# Patient Record
Sex: FEMALE | Race: WHITE
[De-identification: names, ages, dates, MRNs, and addresses within clinical notes are randomized per-mention and may not be internally consistent; named-entity substitution may affect disease eponyms.]

---

## 2019-10-18 ENCOUNTER — HOSPITAL (OUTPATIENT)
Dept: OTHER | Age: 74
End: 2019-10-18

## 2019-10-18 ENCOUNTER — DIAGNOSTIC TRANS (OUTPATIENT)
Dept: OTHER | Age: 74
End: 2019-10-18

## 2019-10-18 LAB
ALBUMIN SERPL-MCNC: 3.9 G/DL (ref 3.6–5.1)
ALBUMIN/GLOB SERPL: 1.3 {RATIO} (ref 1–2.4)
ALP SERPL-CCNC: 53 UNITS/L (ref 45–117)
ALT SERPL-CCNC: 23 UNITS/L
ANALYZER ANC (IANC): ABNORMAL
ANION GAP SERPL CALC-SCNC: 8 MMOL/L (ref 10–20)
APTT PPP: 25 SEC (ref 22–32)
APTT PPP: NORMAL S
APTT PPP: NORMAL S
AST SERPL-CCNC: 17 UNITS/L
BASOPHILS # BLD: 0 K/MCL (ref 0–0.3)
BASOPHILS NFR BLD: 0 %
BILIRUB SERPL-MCNC: 0.4 MG/DL (ref 0.2–1)
BUN SERPL-MCNC: 18 MG/DL (ref 6–20)
BUN/CREAT SERPL: 20 (ref 7–25)
CALCIUM SERPL-MCNC: 8.5 MG/DL (ref 8.4–10.2)
CHLORIDE SERPL-SCNC: 109 MMOL/L (ref 98–107)
CO2 SERPL-SCNC: 29 MMOL/L (ref 21–32)
CREAT SERPL-MCNC: 0.89 MG/DL (ref 0.51–0.95)
DIFFERENTIAL METHOD BLD: ABNORMAL
EOSINOPHIL # BLD: 0.1 K/MCL (ref 0.1–0.5)
EOSINOPHIL NFR BLD: 2 %
ERYTHROCYTE [DISTWIDTH] IN BLOOD: 15 % (ref 11–15)
GLOBULIN SER-MCNC: 3.1 G/DL (ref 2–4)
GLUCOSE SERPL-MCNC: 117 MG/DL (ref 65–99)
HCT VFR BLD CALC: 40.3 % (ref 36–46.5)
HGB BLD-MCNC: 12.9 G/DL (ref 12–15.5)
IMM GRANULOCYTES # BLD AUTO: 0 K/MCL (ref 0–0.2)
IMM GRANULOCYTES NFR BLD: 0 %
INR PPP: 1
INR PPP: NORMAL
LYMPHOCYTES # BLD: 1.3 K/MCL (ref 1–4)
LYMPHOCYTES NFR BLD: 21 %
MCH RBC QN AUTO: 32.2 PG (ref 26–34)
MCHC RBC AUTO-ENTMCNC: 32 G/DL (ref 32–36.5)
MCV RBC AUTO: 100.5 FL (ref 78–100)
MONOCYTES # BLD: 0.7 K/MCL (ref 0.3–0.9)
MONOCYTES NFR BLD: 11 %
NEUTROPHILS # BLD: 4.2 K/MCL (ref 1.8–7.7)
NEUTROPHILS NFR BLD: 66 %
NEUTS SEG NFR BLD: ABNORMAL %
NRBC (NRBCRE): 0 /100 WBC
PLATELET # BLD: 233 K/MCL (ref 140–450)
POTASSIUM SERPL-SCNC: 3.9 MMOL/L (ref 3.4–5.1)
PROT SERPL-MCNC: 7 G/DL (ref 6.4–8.2)
PROTHROMBIN TIME (PRT2): NORMAL
PROTHROMBIN TIME: 10.2 SEC (ref 9.7–11.8)
RBC # BLD: 4.01 MIL/MCL (ref 4–5.2)
SODIUM SERPL-SCNC: 142 MMOL/L (ref 135–145)
TROPONIN I SERPL HS-MCNC: <0.02 NG/ML
WBC # BLD: 6.3 K/MCL (ref 4.2–11)

## 2019-10-18 PROCEDURE — 99223 1ST HOSP IP/OBS HIGH 75: CPT | Performed by: INTERNAL MEDICINE

## 2019-10-18 PROCEDURE — 99223 1ST HOSP IP/OBS HIGH 75: CPT | Performed by: PSYCHIATRY & NEUROLOGY

## 2019-10-18 PROCEDURE — 99291 CRITICAL CARE FIRST HOUR: CPT | Performed by: EMERGENCY MEDICINE

## 2019-10-19 LAB
ANALYZER ANC (IANC): ABNORMAL
ANION GAP SERPL CALC-SCNC: 9 MMOL/L (ref 10–20)
BASOPHILS # BLD: 0 K/MCL (ref 0–0.3)
BASOPHILS NFR BLD: 0 %
BUN SERPL-MCNC: 15 MG/DL (ref 6–20)
BUN/CREAT SERPL: 16 (ref 7–25)
CALCIUM SERPL-MCNC: 8.2 MG/DL (ref 8.4–10.2)
CHLORIDE SERPL-SCNC: 108 MMOL/L (ref 98–107)
CHOLEST SERPL-MCNC: 204 MG/DL
CHOLEST SERPL-MCNC: ABNORMAL MG/DL
CHOLEST/HDLC SERPL: 4.3 {RATIO}
CO2 SERPL-SCNC: 29 MMOL/L (ref 21–32)
CREAT SERPL-MCNC: 0.93 MG/DL (ref 0.51–0.95)
DIFFERENTIAL METHOD BLD: ABNORMAL
EOSINOPHIL # BLD: 0.1 K/MCL (ref 0.1–0.5)
EOSINOPHIL NFR BLD: 2 %
ERYTHROCYTE [DISTWIDTH] IN BLOOD: 14.8 % (ref 11–15)
GLUCOSE SERPL-MCNC: 95 MG/DL (ref 65–99)
HBA1C MFR BLD: 10.0           24 %
HBA1C MFR BLD: 5 % (ref 4.5–5.6)
HBA1C MFR BLD: 6.0            126 %
HBA1C MFR BLD: 6.5            14 %
HBA1C MFR BLD: 7.0            154 %
HBA1C MFR BLD: 7.5            169 %
HBA1C MFR BLD: 8.0            183 %
HBA1C MFR BLD: 8.5            197 %
HBA1C MFR BLD: 9.0            212 %
HBA1C MFR BLD: 9.5            226 %
HBA1C MFR BLD: NORMAL %
HCT VFR BLD CALC: 38.9 % (ref 36–46.5)
HDLC SERPL-MCNC: 47 MG/DL
HDLC SERPL-MCNC: ABNORMAL MG/DL
HGB BLD-MCNC: 12.5 G/DL (ref 12–15.5)
IMM GRANULOCYTES # BLD AUTO: 0 K/MCL (ref 0–0.2)
IMM GRANULOCYTES NFR BLD: 0 %
LDLC SERPL CALC-MCNC: 134 MG/DL
LDLC SERPL CALC-MCNC: ABNORMAL MG/DL
LYMPHOCYTES # BLD: 2 K/MCL (ref 1–4)
LYMPHOCYTES NFR BLD: 33 %
MAGNESIUM SERPL-MCNC: 2.4 MG/DL (ref 1.7–2.4)
MCH RBC QN AUTO: 31.7 PG (ref 26–34)
MCHC RBC AUTO-ENTMCNC: 32.1 G/DL (ref 32–36.5)
MCV RBC AUTO: 98.7 FL (ref 78–100)
MONOCYTES # BLD: 0.8 K/MCL (ref 0.3–0.9)
MONOCYTES NFR BLD: 13 %
NEUTROPHILS # BLD: 3.1 K/MCL (ref 1.8–7.7)
NEUTROPHILS NFR BLD: 52 %
NEUTS SEG NFR BLD: ABNORMAL %
NONHDLC SERPL-MCNC: 157 MG/DL
NONHDLC SERPL-MCNC: ABNORMAL MG/DL
NRBC (NRBCRE): 0 /100 WBC
PHOSPHATE SERPL-MCNC: 2.8 MG/DL (ref 2.4–4.7)
PLATELET # BLD: 226 K/MCL (ref 140–450)
POTASSIUM SERPL-SCNC: 3.8 MMOL/L (ref 3.4–5.1)
RBC # BLD: 3.94 MIL/MCL (ref 4–5.2)
SODIUM SERPL-SCNC: 142 MMOL/L (ref 135–145)
TRIGL SERPL-MCNC: 114 MG/DL
TRIGL SERPL-MCNC: ABNORMAL MG/DL
WBC # BLD: 6 K/MCL (ref 4.2–11)

## 2019-10-19 PROCEDURE — 93306 TTE W/DOPPLER COMPLETE: CPT | Performed by: INTERNAL MEDICINE

## 2019-10-19 PROCEDURE — 99239 HOSP IP/OBS DSCHRG MGMT >30: CPT | Performed by: INTERNAL MEDICINE

## 2019-10-19 PROCEDURE — 99233 SBSQ HOSP IP/OBS HIGH 50: CPT | Performed by: PSYCHIATRY & NEUROLOGY

## 2019-11-14 ENCOUNTER — HOSPITAL (OUTPATIENT)
Dept: OTHER | Age: 74
End: 2019-11-14
Attending: INTERNAL MEDICINE

## 2019-12-01 ENCOUNTER — HOSPITAL (OUTPATIENT)
Dept: OTHER | Age: 74
End: 2019-12-01
Attending: INTERNAL MEDICINE

## 2020-01-01 ENCOUNTER — HOSPITAL (OUTPATIENT)
Dept: OTHER | Age: 75
End: 2020-01-01
Attending: INTERNAL MEDICINE

## 2022-11-15 ENCOUNTER — HOSPITAL ENCOUNTER (OUTPATIENT)
Dept: CT IMAGING | Facility: HOSPITAL | Age: 77
Discharge: HOME | End: 2022-11-15
Attending: INTERNAL MEDICINE
Payer: MEDICARE

## 2022-11-15 DIAGNOSIS — R14.0 BLOATING: ICD-10-CM

## 2022-11-15 DIAGNOSIS — K92.1 HEMATOCHEZIA: ICD-10-CM

## 2022-11-15 LAB
CREAT BLD-MCNC: 0.9 MG/DL
GFR SERPLBLD BASED ON 1.73 SQ M-ARVRAT: 65 ML/MIN/1.73M2 (ref 60–?)

## 2022-11-15 PROCEDURE — 74177 CT ABD & PELVIS W/CONTRAST: CPT | Performed by: INTERNAL MEDICINE

## 2022-11-15 PROCEDURE — 82565 ASSAY OF CREATININE: CPT

## 2022-11-16 ENCOUNTER — OFFICE VISIT (OUTPATIENT)
Dept: HEMATOLOGY/ONCOLOGY | Facility: HOSPITAL | Age: 77
End: 2022-11-16
Attending: OBSTETRICS & GYNECOLOGY
Payer: MEDICARE

## 2022-11-16 VITALS
OXYGEN SATURATION: 94 % | HEART RATE: 99 BPM | SYSTOLIC BLOOD PRESSURE: 160 MMHG | WEIGHT: 168.19 LBS | TEMPERATURE: 97 F | DIASTOLIC BLOOD PRESSURE: 86 MMHG | BODY MASS INDEX: 28 KG/M2 | RESPIRATION RATE: 18 BRPM

## 2022-11-16 PROBLEM — N83.8 MASS, OVARIAN: Status: ACTIVE | Noted: 2022-11-16

## 2022-11-16 PROCEDURE — 99211 OFF/OP EST MAY X REQ PHY/QHP: CPT

## 2022-11-16 NOTE — PROGRESS NOTES
Results reviewed. Released to [address]    Discussed with pt over the phone  Will arrange for urgent consultation with [facility]  Eventual colonoscopy pending Gyne workup  She will call to establish care with new PCP     Nitin Chiang MD

## 2022-11-16 NOTE — PROGRESS NOTES
Patient is here for MD consult. Patient presented with abdominal distention and firm to the touch. Hx of constipation followed by GI. Patient had a CT scan completed yesterday. Patient was found to have a large ovarian mass. Here to further discuss.        Education Record    Learner:  Patient    Disease / Diagnosis:  Consult     Barriers / Limitations:  None   Comments:    Method:  Discussion   Comments:    General Topics:  Plan of care reviewed   Comments:    Outcome:  Shows understanding   Comments:

## 2022-11-18 ENCOUNTER — OFFICE VISIT (OUTPATIENT)
Dept: INTERNAL MEDICINE CLINIC | Facility: CLINIC | Age: 77
End: 2022-11-18
Payer: MEDICARE

## 2022-11-18 VITALS
HEART RATE: 73 BPM | DIASTOLIC BLOOD PRESSURE: 70 MMHG | SYSTOLIC BLOOD PRESSURE: 122 MMHG | HEIGHT: 64.61 IN | OXYGEN SATURATION: 98 % | WEIGHT: 167.19 LBS | RESPIRATION RATE: 16 BRPM | TEMPERATURE: 98 F | BODY MASS INDEX: 28.2 KG/M2

## 2022-11-18 DIAGNOSIS — Z86.79 S/P MAZE OPERATION FOR ATRIAL FIBRILLATION: ICD-10-CM

## 2022-11-18 DIAGNOSIS — M85.89 OSTEOPENIA OF MULTIPLE SITES: ICD-10-CM

## 2022-11-18 DIAGNOSIS — I48.0 PAROXYSMAL ATRIAL FIBRILLATION (HCC): ICD-10-CM

## 2022-11-18 DIAGNOSIS — Z86.73 HISTORY OF CVA (CEREBROVASCULAR ACCIDENT): ICD-10-CM

## 2022-11-18 DIAGNOSIS — Z85.3 HISTORY OF LEFT BREAST CANCER: ICD-10-CM

## 2022-11-18 DIAGNOSIS — F41.9 ANXIETY: ICD-10-CM

## 2022-11-18 DIAGNOSIS — R19.00 PELVIC MASS: Primary | ICD-10-CM

## 2022-11-18 DIAGNOSIS — E78.00 HYPERCHOLESTEROLEMIA: ICD-10-CM

## 2022-11-18 DIAGNOSIS — Z98.890 S/P MAZE OPERATION FOR ATRIAL FIBRILLATION: ICD-10-CM

## 2022-11-18 DIAGNOSIS — Z98.890 S/P MVR (MITRAL VALVE REPAIR): ICD-10-CM

## 2022-11-18 PROCEDURE — 99205 OFFICE O/P NEW HI 60 MIN: CPT | Performed by: NURSE PRACTITIONER

## 2022-11-18 NOTE — PATIENT INSTRUCTIONS
Get the surgery as planned on Monday. Get your fasting cholesterol test done few weeks after surgery. You should be fasting for at least 10 hours. If you take a multivitamin with Biotin or any biotin product it should be held for 3 days prior to getting your labs done. Follow up in 1-2 months for your annual wellness visit or sooner as needed.

## 2022-11-23 ENCOUNTER — TELEPHONE (OUTPATIENT)
Dept: INTERNAL MEDICINE CLINIC | Facility: CLINIC | Age: 77
End: 2022-11-23

## 2022-11-23 NOTE — TELEPHONE ENCOUNTER
Medical Record Release Documents have been Signed, Faxed and Confirmation Received. Awaiting Records.

## 2022-12-21 ENCOUNTER — TELEPHONE (OUTPATIENT)
Dept: INTERNAL MEDICINE CLINIC | Facility: CLINIC | Age: 77
End: 2022-12-21

## 2022-12-21 NOTE — TELEPHONE ENCOUNTER
Incoming (mail or fax):  fax  Received from:  [facility]  Documentation given to:  Triage results bin    Lab results

## 2022-12-22 NOTE — TELEPHONE ENCOUNTER
Medical reports from [facility] coming though. Pt did the labs in 5/31/22 and  previous Dexa  Scans .  In Katelin's  bin for review

## 2023-01-03 NOTE — TELEPHONE ENCOUNTER
Noted     Future Appointments   Date Time Provider Alcon Gutierrez   1/4/2023  2:00 PM Valerie Monson, APRN [address] [facility], please call the pt also to make appt, thank you

## 2023-01-06 ENCOUNTER — MED REC SCAN ONLY (OUTPATIENT)
Dept: INTERNAL MEDICINE CLINIC | Facility: CLINIC | Age: 78
End: 2023-01-06

## 2023-03-22 ENCOUNTER — TELEPHONE (OUTPATIENT)
Dept: INTERNAL MEDICINE CLINIC | Facility: CLINIC | Age: 78
End: 2023-03-22

## 2023-03-22 NOTE — TELEPHONE ENCOUNTER
Patient saw Esther Stevens on 11/18/22 for a pelvic mass. She has ovarian cancer and sees an oncologist through [facility] (Dr Karson Guillen). She has had three chemo treatments and the last one was very difficult. She is looking for home health agencies to help with light housekeeping. She does not need medical assistance through home health. She said she has not received help from [facility] when she has asked. Does Esther Stevens have any recommendations for a  or anyone else who may be able to help? Please advise. Thank you!

## 2023-03-23 NOTE — TELEPHONE ENCOUNTER
I do not know any specific home health agencies that provides housekeeping services. She can check with her insurance first or [employer]. Please provide her information on senior service department. Some agencies I searched online that might provided non-medical home care is through [employer] or [facility]. Please give her information on those as well. Thank you.

## 2023-03-23 NOTE — TELEPHONE ENCOUNTER
Left message to call back     [facility] services [phone]  Assisting hands Alex [facility]  [phone]

## 2023-06-12 ENCOUNTER — TELEPHONE (OUTPATIENT)
Dept: INTERNAL MEDICINE CLINIC | Facility: CLINIC | Age: 78
End: 2023-06-12

## 2023-06-21 ENCOUNTER — OFFICE VISIT (OUTPATIENT)
Dept: INTERNAL MEDICINE CLINIC | Facility: CLINIC | Age: 78
End: 2023-06-21
Payer: MEDICARE

## 2023-06-21 ENCOUNTER — LAB ENCOUNTER (OUTPATIENT)
Dept: LAB | Age: 78
End: 2023-06-21
Attending: NURSE PRACTITIONER
Payer: MEDICARE

## 2023-06-21 VITALS
OXYGEN SATURATION: 98 % | SYSTOLIC BLOOD PRESSURE: 106 MMHG | HEART RATE: 73 BPM | HEIGHT: 64.61 IN | WEIGHT: 149.63 LBS | RESPIRATION RATE: 14 BRPM | TEMPERATURE: 98 F | BODY MASS INDEX: 25.23 KG/M2 | DIASTOLIC BLOOD PRESSURE: 68 MMHG

## 2023-06-21 DIAGNOSIS — E83.42 HYPOMAGNESEMIA: ICD-10-CM

## 2023-06-21 DIAGNOSIS — G45.9 TIA (TRANSIENT ISCHEMIC ATTACK): ICD-10-CM

## 2023-06-21 DIAGNOSIS — Z09 HOSPITAL DISCHARGE FOLLOW-UP: Primary | ICD-10-CM

## 2023-06-21 DIAGNOSIS — C56.1 CLEAR CELL CARCINOMA OF OVARY, RIGHT (HCC): ICD-10-CM

## 2023-06-21 DIAGNOSIS — G57.31 NEUROPATHY OF RIGHT PERONEAL NERVE: ICD-10-CM

## 2023-06-21 DIAGNOSIS — M21.371 RIGHT FOOT DROP: ICD-10-CM

## 2023-06-21 DIAGNOSIS — D64.9 ANEMIA, UNSPECIFIED TYPE: ICD-10-CM

## 2023-06-21 DIAGNOSIS — E78.00 HYPERCHOLESTEROLEMIA: ICD-10-CM

## 2023-06-21 DIAGNOSIS — C56.1 MALIGNANT NEOPLASM OF RIGHT OVARY (HCC): ICD-10-CM

## 2023-06-21 DIAGNOSIS — I10 PRIMARY HYPERTENSION: ICD-10-CM

## 2023-06-21 LAB — MAGNESIUM SERPL-MCNC: 1.6 MG/DL (ref 1.6–2.6)

## 2023-06-21 PROCEDURE — 83735 ASSAY OF MAGNESIUM: CPT | Performed by: NURSE PRACTITIONER

## 2023-06-21 PROCEDURE — 36415 COLL VENOUS BLD VENIPUNCTURE: CPT | Performed by: NURSE PRACTITIONER

## 2023-06-21 PROCEDURE — 1111F DSCHRG MED/CURRENT MED MERGE: CPT | Performed by: NURSE PRACTITIONER

## 2023-06-21 PROCEDURE — 99215 OFFICE O/P EST HI 40 MIN: CPT | Performed by: NURSE PRACTITIONER

## 2023-06-21 RX ORDER — PROCHLORPERAZINE MALEATE 10 MG
TABLET ORAL
COMMUNITY
Start: 2023-01-31 | End: 2023-06-21 | Stop reason: ALTCHOICE

## 2023-06-21 RX ORDER — CLOPIDOGREL BISULFATE 75 MG/1
75 TABLET ORAL DAILY
COMMUNITY
Start: 2023-06-10

## 2023-06-21 RX ORDER — LISINOPRIL 5 MG/1
5 TABLET ORAL DAILY
COMMUNITY
Start: 2023-06-11

## 2023-06-21 RX ORDER — ATORVASTATIN CALCIUM 40 MG/1
40 TABLET, FILM COATED ORAL NIGHTLY
COMMUNITY
Start: 2023-06-10

## 2023-06-21 RX ORDER — ONDANSETRON 4 MG/1
4 TABLET, ORALLY DISINTEGRATING ORAL EVERY 8 HOURS PRN
COMMUNITY
Start: 2023-02-17 | End: 2023-06-21 | Stop reason: ALTCHOICE

## 2023-06-26 ENCOUNTER — TELEPHONE (OUTPATIENT)
Dept: INTERNAL MEDICINE CLINIC | Facility: CLINIC | Age: 78
End: 2023-06-26

## 2023-06-26 NOTE — TELEPHONE ENCOUNTER
Pt called and stated that her neurologist prescribed her meds and she needs it sent to pill pack. Buck Sigala  advised to reach out to the neurologist office for refills

## 2023-07-11 ENCOUNTER — TELEPHONE (OUTPATIENT)
Dept: INTERNAL MEDICINE CLINIC | Facility: CLINIC | Age: 78
End: 2023-07-11

## 2023-07-11 NOTE — TELEPHONE ENCOUNTER
Talked to pt and pt said she had TIA in beginning of June. Pt worrying that her BP is elevated. Pt denies chest pain , SOB, vision changes and dizziness . Denies any acute symptoms. Pt's Blood pressure readings:   7/6 139/78  7/7 136/68  7/8 140/77  7/9 142/77  7/10 136/68    Pt said they want to keep the BP below 140/80. Pt taking lisinopril 5 MG Oral Tab daily and metoprolol succinate ER 25 MG Oral Tablet 24 Hr daily.      Please advise

## 2023-07-11 NOTE — TELEPHONE ENCOUNTER
Most of these BP readings are <140/90. If BP is persistently staying >140/90, she can increase the Lisinopril to 10 mg daily and notify us of the increase. Follow up in office if BP still staying >140/90 after increasing Lisinopril. Continue the same dose of Metoprolol succinate. Low salt diet and regular exercise as tolerated. Thanks.

## 2023-07-13 ENCOUNTER — TELEMEDICINE (OUTPATIENT)
Dept: INTERNAL MEDICINE CLINIC | Facility: CLINIC | Age: 78
End: 2023-07-13
Payer: MEDICARE

## 2023-07-13 ENCOUNTER — TELEPHONE (OUTPATIENT)
Dept: INTERNAL MEDICINE CLINIC | Facility: CLINIC | Age: 78
End: 2023-07-13

## 2023-07-13 DIAGNOSIS — R53.83 FATIGUE, UNSPECIFIED TYPE: Primary | ICD-10-CM

## 2023-07-13 DIAGNOSIS — I10 PRIMARY HYPERTENSION: ICD-10-CM

## 2023-07-13 DIAGNOSIS — E55.9 VITAMIN D DEFICIENCY: ICD-10-CM

## 2023-07-13 DIAGNOSIS — R79.89 HIGH SERUM METHYLMALONIC ACID: ICD-10-CM

## 2023-07-13 DIAGNOSIS — D64.9 ANEMIA, UNSPECIFIED TYPE: ICD-10-CM

## 2023-07-13 PROBLEM — G62.9 NEUROPATHY: Status: ACTIVE | Noted: 2023-07-13

## 2023-07-13 PROBLEM — G57.31: Status: ACTIVE | Noted: 2023-07-13

## 2023-07-13 NOTE — PROGRESS NOTES
[facility]    Virtual Video Check-In    Saima Carreno verbally consents to a [employer] visit on 07/13/23. Patient has been referred to the [facility] website at [email or web address] to review the yearly Consent to Treat document. Patient understands and accepts financial responsibility for any deductible, co-insurance and/or co-pays associated with this service. CHIEF COMPLAINT   Patient presents with:  Fatigue: Last Chemo 5/15/23, pt is still experiencing heavy fatigue daily, pt's oncologist thinks the fatigue is not related to chemo treatments - referred back to PCP      HPI:   Saima Carreno is a 76year old female with complaints of ongoing fatigue from the past couple of months. She has a history of stage IIIc ovarian clear cell ca diagnosed in 11/2022 s/p SHAW/BSO and chemo. She finished chemo mid-may. Still has ongoing fatigue since the chemo. She does have mild anemia since her surgery and chemo, Hgb in 10s with elevated MCV and MCH. She sees a neurologist who recommended she start b12 injections due to neuropathy and elevated  methylmalonic acid levels. She has tried to contact her neurologist regarding b12 injections and how to set that up but haven't heard anything back yet. She is frustrated in general because she lives alone and having to make these phone calls and arrange things is causing her to be overwhelmed. She is seeing psychiatrist and therapist.     HTN: she called with isolated elevated BP reading >140/90 few days ago. She was advised to increase the Lisinopril to 10 mg daily if BP staying >140/90. Reports it has been <140/90 and still taking Lisinopril 5 mg daily as of now. Denies any CP, SOB, palpitations, headaches, or vision changes. Current Outpatient Medications   Medication Sig Dispense Refill    clonazePAM 0.5 MG Oral Tab Take 0.5 tablets (0.25 mg total) by mouth 2 (two) times daily as needed for Anxiety.  90 tablet 3    PARoxetine 20 MG Oral Tab Take 3 tablets (60 mg total) by mouth every morning. (Patient taking differently: Take 3 tablets (60 mg total) by mouth at bedtime.) 90 tablet 3    QUEtiapine 25 MG Oral Tab Take 1 tablet (25 mg total) by mouth nightly. 90 tablet 3    atorvastatin 40 MG Oral Tab Take 1 tablet (40 mg total) by mouth nightly. TAKE AT BEDTIME      lisinopril 5 MG Oral Tab Take 1 tablet (5 mg total) by mouth daily. aspirin 81 MG Oral Chew Tab Chew 1 tablet (81 mg total) by mouth daily. metoprolol succinate ER 25 MG Oral Tablet 24 Hr Take 1 tablet (25 mg total) by mouth daily. Cyanocobalamin 1000 MCG/ML Injection Kit Take one injection daily for 1 week followed by weekly for 1 month and then monthly for 1 year. Total 21 injections. (Patient not taking: Reported on 7/13/2023)        Past Medical History:   Diagnosis Date    A-fib ([facility] [place] [address]     Acute, but ill-defined, cerebrovascular disease     Breast cancer ([facility] [place] [address]     Constipation     Feeling lonely     Hearing loss     Hemorrhoids     High cholesterol     History of depression     Irregular bowel habits     Leaking of urine     Mass, ovarian 11/16/2022    Stool incontinence     TIA (transient ischemic attack)     Wears glasses       Social History:  Social History     Socioeconomic History    Marital status:     Number of children: 2   Occupational History    Occupation: Retired RN   Tobacco Use    Smoking status: Never     Passive exposure: Never    Smokeless tobacco: Never   Vaping Use    Vaping Use: Never used   Substance and Sexual Activity    Alcohol use: Yes    Drug use: Never    Sexual activity: Not Currently   Other Topics Concern    Occupational Exposure No    Special Diet No    Exercise Yes    Seat Belt Yes    Self-Exams Yes        REVIEW OF SYSTEMS:   See HPI. EXAM:   Limited due to COVID-19  GENERAL: Appears stated age and in no apparent distress. Alert and oriented x3.    LUNGS: Patient speaks clearly in full sentences without dyspnea, no cough while on video visit.   PSYCH: Appropriate mood and affect. LABS:      Lab Results   Component Value Date    WBC 6.9 11/15/2022    RBC 4.16 11/15/2022    HGB 13.1 11/15/2022    HCT 39.6 11/15/2022    MCV 95.2 11/15/2022    MCH 31.5 11/15/2022    MCHC 33.1 11/15/2022    RDW 14.6 11/15/2022     11/15/2022      Lab Results   Component Value Date    GLU 89 11/15/2022    BUN 13 11/15/2022    BUNCREA NOT APPLICABLE 52/60/0886    CREATSERUM 0.89 11/15/2022    CA 9.0 11/15/2022    ALKPHO 55 11/15/2022    AST 15 11/15/2022    ALT 9 11/15/2022    BILT 0.8 11/15/2022    TP 7.0 11/15/2022    ALB 4.1 11/15/2022    GLOBULIN 2.9 11/15/2022    AGRATIO 1.4 11/15/2022     11/15/2022    K 3.9 11/15/2022    CL 99 11/15/2022    CO2 26 11/15/2022      No results found for: CHOLEST, TRIG, HDL, LDL, VLDL, TCHDLRATIO, NONHDLC, CHOLHDLRATIO, NONHDLC, CALCNONHDL   No results found for: T4F, TSH, TSHT4   No results found for: EAG, A1C     IMAGING:     No results found. ASSESSMENT AND PLAN:      1. Fatigue, unspecified type  -Likely multifactorial from chemo, anemia, vitamin deficiencies  -Do labs   - VITAMIN D; Future  - VITAMIN J12; Future  - FOLIC ACID SERUM(FOLATE); Future  - CBC WITH DIFFERENTIAL WITH PLATELET; Future    2. Anemia, unspecified type  -Check CBC  - CBC WITH DIFFERENTIAL WITH PLATELET; Future    3. High serum methylmalonic acid  -Advised to contact her neurologist office regarding b12 injections   - VITAMIN B12; Future    4. Primary hypertension  -BP stable now per patient. CPM  -Referred to chronic care management   - [facility] REFER TO CHRONIC CARE MGMT    5. Vitamin D deficiency  -Continue vitamin D supplements  -Check vitamin D  - VITAMIN D; Future    The patient indicates understanding of these issues and agrees to the plan. The patient is asked to return as needed or when routine care is due.     Charley Tate, APRN  7/13/2023    Patient understands phone/video evaluation is not a substitute for face-to-face examination or emergency care. Patient advised to go to the ER or call 911 for worsening symptoms or acute distress. The patient was also advised of the potential privacy & security concerns related to the telehealth platform. Lastly, the patient confirmed that they were in [place]. Please note that the following visit was completed using two-way, real-time interactive audio and video communication. This has been done in good kayla to provide continuity of care in the best interest of the provider-patient relationship, due to the ongoing public health crisis/national emergency and because of restrictions of visitation. There are limitations of this visit as no physical exam could be performed. Every conscious effort was taken to allow for sufficient and adequate time. This billing was spent on reviewing labs, medications, radiology tests and decision making. Appropriate medical decision-making and tests are ordered as detailed in the plan of care above.

## 2023-07-13 NOTE — TELEPHONE ENCOUNTER
Future Appointments   Date Time Provider Alcon Brenda   7/13/2023  3:20 PM BRENNON Gonzalez [facility] [place]   8/10/2023  1:30 PM BRENNON [facility]   10/17/2023  1:00 PM Gerard Nelson MD [facility]     Called the pt and VV made for pt to discuss this fatigue with NP today.

## 2023-07-13 NOTE — TELEPHONE ENCOUNTER
Pt called and is experiencing some fatigue. Cancer dr recommended she take to someone here as he thinks its unrelated to the pt radiation treatment. pt decline to schedule appt and would like a call back from nurse

## 2023-08-29 RX ORDER — LISINOPRIL 5 MG/1
5 TABLET ORAL DAILY
Qty: 90 TABLET | Refills: 0 | Status: SHIPPED | OUTPATIENT
Start: 2023-08-29

## 2023-08-29 NOTE — TELEPHONE ENCOUNTER
Is this medication prescribed by the [facility] Providers? No?    Did the patient contact the pharmacy directly?:  yes said it was discontinued     Is patient out of meds or supply very low?: low     Medication Requested:  lisinopril     Dose:  5 mg    Is patient requesting a 30 or 90 day supply?:  90    Pharmacy name and phone # or location:   [address] [facility] [phone], [phone]       Is the patient due for an appointment?: no (if so, please schedule appt)    Additional Notes:  pt is not sure where she got the rx but thinks it was given when she was in the hospital. Not sure if she needs additional appt for a refill on this     Please advise the patient refills take up to 72 business hours.

## 2023-08-29 NOTE — TELEPHONE ENCOUNTER
Last OV relevant to medication: 7/13/23  Last refill date: historical- started during hospitalization  When pt was asked to return for OV: 8/21/23  Upcoming appt/reason:   Future Appointments   Date Time Provider Alcon Brenda   9/11/2023  1:30 PM BRENNON Oneal [facility]   10/17/2023  1:00 PM General China MD [facility]   Was pt informed of any over due labs: message sent  Lab Results   Component Value Date    GLU 89 11/15/2022    BUN 13 11/15/2022    BUNCREA NOT APPLICABLE 70/93/1523    CREATSERUM 0.89 11/15/2022    CA 9.0 11/15/2022    ALKPHO 55 11/15/2022    AST 15 11/15/2022    ALT 9 11/15/2022    BILT 0.8 11/15/2022    TP 7.0 11/15/2022    ALB 4.1 11/15/2022    GLOBULIN 2.9 11/15/2022    AGRATIO 1.4 11/15/2022     11/15/2022    K 3.9 11/15/2022    CL 99 11/15/2022    CO2 26 11/15/2022

## 2023-09-11 ENCOUNTER — TELEPHONE (OUTPATIENT)
Dept: INTERNAL MEDICINE CLINIC | Facility: CLINIC | Age: 78
End: 2023-09-11

## 2023-09-11 NOTE — TELEPHONE ENCOUNTER
Pt called because they want to switch from an assisted living facility to a nursing home because of a recent cancer diagnosis. She says she thinks she only needs a note from Dr Flaquito Junior to get out of her lease and that's it.  I scheduled an appt with Zulema on Wednesday, unless this can be done without an appt

## 2023-09-11 NOTE — TELEPHONE ENCOUNTER
Okay to give letter without appointment. It can state her cancer diagnosis and that she needs accelerated level of care. If they require any additional information, it should ideally come from her oncologist. Thanks.

## 2023-10-17 ENCOUNTER — OFFICE VISIT (OUTPATIENT)
Dept: INTERNAL MEDICINE CLINIC | Facility: CLINIC | Age: 78
End: 2023-10-17
Payer: MEDICARE

## 2023-10-17 VITALS
HEART RATE: 72 BPM | TEMPERATURE: 98 F | RESPIRATION RATE: 16 BRPM | SYSTOLIC BLOOD PRESSURE: 130 MMHG | BODY MASS INDEX: 26.14 KG/M2 | WEIGHT: 155 LBS | DIASTOLIC BLOOD PRESSURE: 80 MMHG | HEIGHT: 64.61 IN

## 2023-10-17 DIAGNOSIS — Z86.73 HISTORY OF TRANSIENT ISCHEMIC ATTACK (TIA): ICD-10-CM

## 2023-10-17 DIAGNOSIS — Z23 NEED FOR VACCINATION: ICD-10-CM

## 2023-10-17 DIAGNOSIS — Z78.0 POSTMENOPAUSAL: ICD-10-CM

## 2023-10-17 DIAGNOSIS — E78.00 HYPERCHOLESTEROLEMIA: ICD-10-CM

## 2023-10-17 DIAGNOSIS — Z12.31 ENCOUNTER FOR SCREENING MAMMOGRAM FOR MALIGNANT NEOPLASM OF BREAST: ICD-10-CM

## 2023-10-17 DIAGNOSIS — I10 PRIMARY HYPERTENSION: ICD-10-CM

## 2023-10-17 DIAGNOSIS — C56.1 CLEAR CELL CARCINOMA OF OVARY, RIGHT (HCC): ICD-10-CM

## 2023-10-17 DIAGNOSIS — I48.0 PAROXYSMAL ATRIAL FIBRILLATION (HCC): ICD-10-CM

## 2023-10-17 DIAGNOSIS — Z00.00 ENCOUNTER FOR ANNUAL HEALTH EXAMINATION: Primary | ICD-10-CM

## 2023-10-17 DIAGNOSIS — Z12.11 SCREENING FOR COLON CANCER: ICD-10-CM

## 2023-10-17 PROCEDURE — 99204 OFFICE O/P NEW MOD 45 MIN: CPT | Performed by: INTERNAL MEDICINE

## 2023-10-17 PROCEDURE — 90677 PCV20 VACCINE IM: CPT | Performed by: INTERNAL MEDICINE

## 2023-10-17 PROCEDURE — G0439 PPPS, SUBSEQ VISIT: HCPCS | Performed by: INTERNAL MEDICINE

## 2023-10-17 PROCEDURE — G0009 ADMIN PNEUMOCOCCAL VACCINE: HCPCS | Performed by: INTERNAL MEDICINE

## 2023-10-17 RX ORDER — LISINOPRIL 10 MG/1
10 TABLET ORAL DAILY
Qty: 90 TABLET | Refills: 0 | Status: SHIPPED | OUTPATIENT
Start: 2023-10-17

## 2023-12-02 DIAGNOSIS — I10 PRIMARY HYPERTENSION: ICD-10-CM

## 2023-12-05 RX ORDER — LISINOPRIL 10 MG/1
10 TABLET ORAL DAILY
Qty: 30 TABLET | Refills: 0 | Status: SHIPPED | OUTPATIENT
Start: 2023-12-05

## 2023-12-11 LAB
AMB EXT BILIRUBIN, TOTAL: 0.4 MG/DL
AMB EXT BUN: 15 MG/DL
AMB EXT CALCIUM: 8.6
AMB EXT CARBON DIOXIDE: 31
AMB EXT CHLORIDE: 101
AMB EXT CMP ALT: 16 U/L (ref 7–52)
AMB EXT CMP AST: 19 U/L (ref 13–39)
AMB EXT CREATININE: 0.86 MG/DL
AMB EXT EGFR NON-AA: 68
AMB EXT GLUCOSE: 82 MG/DL
AMB EXT HEMATOCRIT: 39.1 (ref 34.1–44.9)
AMB EXT HEMOGLOBIN: 12 (ref 11.2–15.7)
AMB EXT MCV: 106.5 (ref 79.4–94.8)
AMB EXT PLATELETS: 183 (ref 182–369)
AMB EXT POSTASSIUM: 4.3 MMOL/L (ref 3.5–5)
AMB EXT SODIUM: 141 MMOL/L (ref 135–145)
AMB EXT TOTAL PROTEIN: 6.1 (ref 6.4–8.9)
AMB EXT TSH: 2.47 MIU/ML
AMB EXT VITAMIN D, 25-HYDROXY: 49 (ref 30–100)
AMB EXT WBC: 3.1 X10(3)UL

## 2024-01-02 ENCOUNTER — OFFICE VISIT (OUTPATIENT)
Dept: INTERNAL MEDICINE CLINIC | Facility: CLINIC | Age: 79
End: 2024-01-02
Payer: MEDICARE

## 2024-01-02 VITALS
WEIGHT: 162.81 LBS | TEMPERATURE: 98 F | DIASTOLIC BLOOD PRESSURE: 64 MMHG | HEIGHT: 64.5 IN | SYSTOLIC BLOOD PRESSURE: 106 MMHG | RESPIRATION RATE: 14 BRPM | OXYGEN SATURATION: 99 % | HEART RATE: 76 BPM | BODY MASS INDEX: 27.46 KG/M2

## 2024-01-02 DIAGNOSIS — E83.42 HYPOMAGNESEMIA: ICD-10-CM

## 2024-01-02 DIAGNOSIS — I10 PRIMARY HYPERTENSION: Primary | ICD-10-CM

## 2024-01-02 DIAGNOSIS — E83.39 HYPOPHOSPHATEMIA: ICD-10-CM

## 2024-01-02 PROCEDURE — 99214 OFFICE O/P EST MOD 30 MIN: CPT | Performed by: NURSE PRACTITIONER

## 2024-01-02 RX ORDER — AMOXICILLIN 250 MG
CAPSULE ORAL
COMMUNITY

## 2024-01-02 RX ORDER — METOPROLOL SUCCINATE 25 MG/1
25 TABLET, EXTENDED RELEASE ORAL DAILY
Qty: 90 TABLET | Refills: 1 | Status: SHIPPED | OUTPATIENT
Start: 2024-01-02

## 2024-01-02 RX ORDER — MULTIVITAMIN WITH IRON
50 TABLET ORAL DAILY
COMMUNITY

## 2024-01-02 RX ORDER — LISINOPRIL 10 MG/1
10 TABLET ORAL DAILY
Qty: 90 TABLET | Refills: 1 | Status: SHIPPED | OUTPATIENT
Start: 2024-01-02

## 2024-01-02 RX ORDER — POTASSIUM, SODIUM PHOSPHATES 280 MG-160 MG-250 MG ORAL POWDER PACKET
1 POWDER IN PACKET 2 TIMES DAILY
Qty: 6 EACH | Refills: 0 | Status: SHIPPED | OUTPATIENT
Start: 2024-01-02 | End: 2024-01-05

## 2024-01-02 RX ORDER — MULTIVIT-MIN/IRON/FOLIC ACID/K 18-600-40
CAPSULE ORAL
COMMUNITY

## 2024-01-02 RX ORDER — MULTIVIT WITH MINERALS/LUTEIN
1000 TABLET ORAL DAILY
COMMUNITY

## 2024-01-02 RX ORDER — QUETIAPINE FUMARATE 50 MG/1
50 TABLET, FILM COATED ORAL NIGHTLY
COMMUNITY
Start: 2023-11-29

## 2024-01-02 NOTE — PATIENT INSTRUCTIONS
Dairy: milk, yogurt, cheese  [place]  Beef  Poultry  Pork  Legumes  Nuts, seeds  Whole wheat breads and cereals  Some vegetables: asparagus, tomatoes, cauliflower  Processed foods (as inorganic phosphorus), especially deli meats, porter, sausage, sodas, sports drinks, and other bottled beverages    Phosphorous information above    Get labs repeated in a month    Continue your current medication    Follow up with Katelin in 6 months or sooner as needed

## 2024-01-03 NOTE — PROGRESS NOTES
Nereida Dey is a 78 year old female.  CHIEF COMPLAINT   Med check, lab review    HPI:   HTN- - Stable. No headaches or vision changes. No SE to medication. Taking medication as prescribed.     Labs- phos and mg were low.    Current Outpatient Medications   Medication Sig Dispense Refill    QUEtiapine 50 MG Oral Tab Take 1 tablet (50 mg total) by mouth nightly.      Turmeric (CURCUMIN 95 OR) Take 1,000 mg by mouth.      Cholecalciferol (VITAMIN D) 50 MCG (2000 UT) Oral Cap Take by mouth.      Magnesium Aspartate 65 MG Oral Tab Take 15.3846 tablets (1,000 mg total) by mouth.      Ascorbic Acid (VITAMIN C) 1000 MG Oral Tab Take 1 tablet (1,000 mg total) by mouth daily.      Multiple Vitamins-Minerals (BIOTIN PLUS/CALCIUM/VIT D3) Oral Tab Take by mouth.      pyridoxine 100 MG Oral Tab Take 0.5 tablets (50 mg total) by mouth daily.      Potassium & Sodium Phosphates (PHOSPHORUS SUPPLEMENT) 280-160-250 MG Oral Powd Pack Take 1 each by mouth in the morning and 1 each before bedtime. Do all this for 3 days. 6 each 0    lisinopril 10 MG Oral Tab Take 1 tablet by mouth daily. 30 tablet 0    ARIPiprazole (ABILIFY) 2 MG Oral Tab Take 1 tablet (2 mg total) by mouth nightly. 90 tablet 1    PARoxetine 20 MG Oral Tab Take 3 tablets (60 mg total) by mouth at bedtime. 90 tablet 2    atorvastatin 40 MG Oral Tab Take 1 tablet (40 mg total) by mouth nightly. TAKE AT BEDTIME      Cyanocobalamin 1000 MCG/ML Injection Kit       aspirin 81 MG Oral Chew Tab Chew 1 tablet (81 mg total) by mouth daily.      metoprolol succinate ER 25 MG Oral Tablet 24 Hr Take 1 tablet (25 mg total) by mouth daily.      clonazePAM 0.125 MG Oral Tablet Dispersible Take 1-2 tablets (0.125-0.25 mg total) by mouth every morning. (Patient not taking: Reported on 1/2/2024) 180 tablet 1    clonazePAM 0.25 MG Oral Tablet Dispersible Take 1 tablet (0.25 mg total) by mouth at bedtime. (Patient not taking: Reported on 1/2/2024) 90 tablet 1      Past Medical History:    Diagnosis Date    A-fib (HCC)     Acute, but ill-defined, cerebrovascular disease     Breast cancer (HCC)     Constipation     Feeling lonely     Hearing loss     Hemorrhoids     High cholesterol     History of depression     Irregular bowel habits     Leaking of urine     Mass, ovarian 11/16/2022    Stool incontinence     TIA (transient ischemic attack)     Wears glasses       Social History:  Social History     Socioeconomic History    Marital status:     Number of children: 2   Occupational History    Occupation: Retired RN   Tobacco Use    Smoking status: Never     Passive exposure: Never    Smokeless tobacco: Never   Vaping Use    Vaping Use: Never used   Substance and Sexual Activity    Alcohol use: Yes     Comment: approx 2 drinks per month    Drug use: Never    Sexual activity: Not Currently   Other Topics Concern    Occupational Exposure No    Special Diet No    Exercise Yes    Seat Belt Yes    Self-Exams Yes        REVIEW OF SYSTEMS:   See HPI     EXAM:     /64 (BP Location: Right arm, Patient Position: Sitting, Cuff Size: adult)   Pulse 76   Temp 97.6 °F (36.4 °C) (Temporal)   Resp 14   Ht 5' 4.5\" (1.638 m)   Wt 162 lb 12.8 oz (73.8 kg)   SpO2 99%   BMI 27.51 kg/m²   Body mass index is 27.51 kg/m².   GENERAL: well developed, well nourished,in no apparent distress  HEENT: atraumatic, normocephalic  LUNGS: clear to auscultation; no rhonchi, rales, or wheezing  CARDIO: RRR without murmur  GI: no tenderness  MUSCULOSKELETAL: No obvious joint deformity or swelling.  Normal gait.  EXTREMITIES: no edema  NEURO: Oriented times three       LABS:      Lab Results   Component Value Date    WBC 3.1 12/11/2023    RBC 4.16 11/15/2022    HGB 12.0 12/11/2023    HCT 39.1 12/11/2023    .5 (A) 12/11/2023    MCH 31.5 11/15/2022    MCHC 33.1 11/15/2022    RDW 14.6 11/15/2022     12/11/2023      Lab Results   Component Value Date    GLU 82 12/11/2023    BUN 15 12/11/2023    BUNCREA NOT  APPLICABLE 11/15/2022    CREATSERUM 0.86 12/11/2023    GFRNAA 68 12/11/2023    CA 8.6 12/11/2023    ALKPHO 55 11/15/2022    AST 19 12/11/2023    ALT 16 12/11/2023    BILT 0.4 12/11/2023    TP 6.1 (A) 12/11/2023    ALB 4.1 11/15/2022    GLOBULIN 2.9 11/15/2022    AGRATIO 1.4 11/15/2022     11/15/2022    K 4.3 12/11/2023     12/11/2023    CO2 31 12/11/2023      No results found for: \"CHOLEST\", \"TRIG\", \"HDL\", \"LDL\", \"VLDL\", \"TCHDLRATIO\", \"NONHDLC\", \"CHOLHDLRATIO\", \"CALCNONHDL\"   Lab Results   Component Value Date    TSH 2.470 12/11/2023         IMAGING:     No results found.     ASSESSMENT AND PLAN:   1. Primary hypertension  - - Stable. No headaches or vision changes. No SE to medication. Taking medication as prescribed.   - continue current medication   - lisinopril 10 MG Oral Tab; Take 1 tablet (10 mg total) by mouth daily.  Dispense: 90 tablet; Refill: 1  - metoprolol succinate ER 25 MG Oral Tablet 24 Hr; Take 1 tablet (25 mg total) by mouth daily.  Dispense: 90 tablet; Refill: 1    2. Hypomagnesemia  - continue supplement. Mg diet given  - Magnesium [E]; Future  - Basic Metabolic Panel (8) [E]; Future    3. Hypophosphatemia  - supplement ordered. Phos diet given.  - Phosphorus [E]; Future  - Potassium & Sodium Phosphates (PHOSPHORUS SUPPLEMENT) 280-160-250 MG Oral Powd Pack; Take 1 each by mouth in the morning and 1 each before bedtime. Do all this for 3 days.  Dispense: 6 each; Refill: 0      The patient indicates understanding of these issues and agrees to the plan.  The patient is asked to return in 6 months for annual visit or sooner as needed.

## 2024-01-08 RX ORDER — FLUTICASONE PROPIONATE 50 MCG
2 SPRAY, SUSPENSION (ML) NASAL DAILY
Qty: 16 G | Refills: 0 | Status: SHIPPED | OUTPATIENT
Start: 2024-01-08 | End: 2025-01-02

## 2024-01-08 NOTE — TELEPHONE ENCOUNTER
Pt was seen on 1/2/23.    She said Flonase was suppose to be sent over to:       [facility] [place] [place] - [address] [place] [address] [phone], [phone]   [address] [place] [address]   Phone: [phone] Fax: [phone]       [facility] told pt they don't have order.

## 2024-02-06 RX ORDER — FLUTICASONE PROPIONATE 50 MCG
2 SPRAY, SUSPENSION (ML) NASAL DAILY
Qty: 15.8 ML | Refills: 0 | Status: SHIPPED | OUTPATIENT
Start: 2024-02-06

## 2024-03-06 RX ORDER — FLUTICASONE PROPIONATE 50 MCG
2 SPRAY, SUSPENSION (ML) NASAL DAILY
Qty: 15.8 ML | Refills: 1 | Status: SHIPPED | OUTPATIENT
Start: 2024-03-06

## 2024-03-06 NOTE — TELEPHONE ENCOUNTER
Allergy Medication Protocol [id number]/06/2024 04:26 AM   Protocol Details In person appointment or virtual visit in the past 12 mos or appointment in next 3 mos     Future Appointments   Date Time Provider Department Center   3/26/2024  7:00 PM Zenobia Centeno APRN [facility]   7/1/2024 12:00 PM Blanca Barraza APRN [facility]

## 2024-03-11 ENCOUNTER — TELEPHONE (OUTPATIENT)
Dept: INTERNAL MEDICINE CLINIC | Facility: CLINIC | Age: 79
End: 2024-03-11

## 2024-03-11 NOTE — TELEPHONE ENCOUNTER
Pt called regarding lower Bps and orthostatic symptoms. States she feels lightheaded when first standing x 1 week but this resolves after a moment. She has noticed that her BP has consistently ran lower this last week as well, systolics have been 130s and now running in 100s. Yesterday 105/60, today 101/61. No cp, sob. Offered apt for today with Katelin but pt declined, apt scheduled for BP check tomorrow. Pt advised to hydrate well, continue to monitor BP and symptoms and if symptoms worsen in meantime to go to ER. ANAHI, thanks!

## 2024-03-12 ENCOUNTER — OFFICE VISIT (OUTPATIENT)
Dept: INTERNAL MEDICINE CLINIC | Facility: CLINIC | Age: 79
End: 2024-03-12
Payer: MEDICARE

## 2024-03-12 VITALS
RESPIRATION RATE: 14 BRPM | WEIGHT: 164.81 LBS | OXYGEN SATURATION: 97 % | TEMPERATURE: 98 F | BODY MASS INDEX: 27.8 KG/M2 | HEIGHT: 64.5 IN | HEART RATE: 86 BPM | SYSTOLIC BLOOD PRESSURE: 122 MMHG | DIASTOLIC BLOOD PRESSURE: 72 MMHG

## 2024-03-12 DIAGNOSIS — Z13.220 SCREENING FOR CHOLESTEROL LEVEL: ICD-10-CM

## 2024-03-12 DIAGNOSIS — R42 LIGHTHEADED: Primary | ICD-10-CM

## 2024-03-12 PROCEDURE — 99214 OFFICE O/P EST MOD 30 MIN: CPT | Performed by: NURSE PRACTITIONER

## 2024-03-12 RX ORDER — LISINOPRIL 5 MG/1
5 TABLET ORAL DAILY
COMMUNITY
Start: 2024-03-12

## 2024-03-12 NOTE — PROGRESS NOTES
Nereida Dey is a 78 year old female.  CHIEF COMPLAINT   Lightheadedness    HPI:   The patient has lightheadedness for the last week in the morning for less than 1 minute when first getting out of bed.  She denies any near fainting episode, balance issues, falls.  It does not happen throughout the day and it does not happen every day.  She did notice when she was dizzy her blood pressure was toward the lower end of normal around 101/61 and 105/60.  Denies headaches, trouble swallowing, trouble talking, weakness.  Is taking her     Current Outpatient Medications   Medication Sig Dispense Refill    fluticasone propionate 50 MCG/ACT Nasal Suspension Instill 2 sprays nasally daily. 15.8 mL 1    ARIPiprazole (ABILIFY) 2 MG Oral Tab Take 1 tablet (2 mg total) by mouth at bedtime. 30 tablet 1    PARoxetine 20 MG Oral Tab Take 3 tablets (60 mg total) by mouth at bedtime. 90 tablet 1    QUEtiapine 25 MG Oral Tab Take 1 tablet (25 mg total) by mouth nightly. (Patient taking differently: Take 1 tablet (25 mg total) by mouth nightly. Takes 1.5 tablets) 30 tablet 1    Cholecalciferol (VITAMIN D) 50 MCG (2000 UT) Oral Cap Take by mouth.      Magnesium Aspartate 65 MG Oral Tab Take 2 tablets (130 mg total) by mouth daily.      Ascorbic Acid (VITAMIN C) 1000 MG Oral Tab Take 1 tablet (1,000 mg total) by mouth daily.      pyridoxine 100 MG Oral Tab Take 0.5 tablets (50 mg total) by mouth daily.      lisinopril 10 MG Oral Tab Take 1 tablet (10 mg total) by mouth daily. 90 tablet 1    metoprolol succinate ER 25 MG Oral Tablet 24 Hr Take 1 tablet (25 mg total) by mouth daily. 90 tablet 1    atorvastatin 40 MG Oral Tab Take 1 tablet (40 mg total) by mouth nightly. TAKE AT BEDTIME      Cyanocobalamin 1000 MCG/ML Injection Kit       aspirin 81 MG Oral Chew Tab Chew 1 tablet (81 mg total) by mouth daily.      atomoxetine 10 MG Oral Cap Take 1 capsule (10 mg total) by mouth daily. For 7 days then take 2 caps (20mg) daily. (Patient not  taking: Reported on 3/12/2024) 30 capsule 1    atomoxetine 10 MG Oral Cap Take 10mg (1 cap) daily in AM. May increase to 20mg (2 caps) daily in AM if tolerated. (Patient not taking: Reported on 3/12/2024) 60 capsule 1    QUEtiapine 50 MG Oral Tab Take 1 tablet (50 mg total) by mouth nightly. (Patient not taking: Reported on 3/12/2024)      Turmeric (CURCUMIN 95 OR) Take 1,000 mg by mouth. (Patient not taking: Reported on 3/12/2024)      Multiple Vitamins-Minerals (BIOTIN PLUS/CALCIUM/VIT D3) Oral Tab Take by mouth. (Patient not taking: Reported on 3/12/2024)        Past Medical History:   Diagnosis Date    A-fib (HCC)     Acute, but ill-defined, cerebrovascular disease     Breast cancer (HCC)     Constipation     Feeling lonely     Hearing loss     Hemorrhoids     High cholesterol     History of depression     Irregular bowel habits     Leaking of urine     Mass, ovarian 11/16/2022    Stool incontinence     TIA (transient ischemic attack)     Wears glasses       Social History:  Social History     Socioeconomic History    Marital status:     Number of children: 2   Occupational History    Occupation: Retired RN   Tobacco Use    Smoking status: Never     Passive exposure: Never    Smokeless tobacco: Never   Vaping Use    Vaping Use: Never used   Substance and Sexual Activity    Alcohol use: Yes     Comment: approx 2 drinks per month    Drug use: Never    Sexual activity: Not Currently   Other Topics Concern    Occupational Exposure No    Special Diet No    Exercise Yes    Seat Belt Yes    Self-Exams Yes        REVIEW OF SYSTEMS:   See HPI     EXAM:     /72 (BP Location: Right arm, Patient Position: Sitting, Cuff Size: adult)   Pulse 86   Temp 97.9 °F (36.6 °C) (Temporal)   Resp 14   Ht 5' 4.5\" (1.638 m)   Wt 164 lb 12.8 oz (74.8 kg)   SpO2 97%   BMI 27.85 kg/m²   Body mass index is 27.85 kg/m².   GENERAL: well developed, well nourished,in no apparent distress  HEENT: atraumatic,  normocephalic  EYES:  conjunctiva are clear  LUNGS: clear to auscultation; no rhonchi, rales, or wheezing  CARDIO: RRR without murmur  GI: no tenderness  MUSCULOSKELETAL: No obvious joint deformity or swelling. Normal gait.  EXTREMITIES: no edema  NEURO: Oriented times three,cranial nerves are grossly intact,motor and sensory are grossly intact  Romberg is normal, no pronator drift, rapid movements are intact- tandem heel and toe walking not able to be done.    LABS:      Lab Results   Component Value Date    WBC 3.1 12/11/2023    RBC 4.16 11/15/2022    HGB 12.0 12/11/2023    HCT 39.1 12/11/2023    .5 (A) 12/11/2023    MCH 31.5 11/15/2022    MCHC 33.1 11/15/2022    RDW 14.6 11/15/2022     12/11/2023      Lab Results   Component Value Date    GLU 82 12/11/2023    BUN 15 12/11/2023    BUNCREA NOT APPLICABLE 11/15/2022    CREATSERUM 0.86 12/11/2023    GFRNAA 68 12/11/2023    CA 8.6 12/11/2023    ALKPHO 55 11/15/2022    AST 19 12/11/2023    ALT 16 12/11/2023    BILT 0.4 12/11/2023    TP 6.1 (A) 12/11/2023    ALB 4.1 11/15/2022    GLOBULIN 2.9 11/15/2022    AGRATIO 1.4 11/15/2022     11/15/2022    K 4.3 12/11/2023     12/11/2023    CO2 31 12/11/2023      No results found for: \"CHOLEST\", \"TRIG\", \"HDL\", \"LDL\", \"VLDL\", \"TCHDLRATIO\", \"NONHDLC\", \"CHOLHDLRATIO\", \"CALCNONHDL\"   Lab Results   Component Value Date    TSH 2.470 12/11/2023        ASSESSMENT AND PLAN:   1. Lightheaded  -The patient has been slightly lightheaded for 1 week for less than a minute in the morning with lower blood pressures than usual but still within normal.  No other symptoms, no neurodeficit on exam.  -Lower lisinopril to 5 mg and also place a parameter to hold if blood pressure is low  -Continue metoprolol  -Continue to monitor blood pressure at home and send readings through [employer]  -Get labs to check for anemia, electrolyte deficiency that could contribute to lightheadedness  - CBC W Differential W Platelet [E];  Future    2. Screening for cholesterol level  - Lipid Panel; Future      The patient indicates understanding of these issues and agrees to the plan.  The patient is asked to return as needed or when routine care is due.

## 2024-03-12 NOTE — PATIENT INSTRUCTIONS
Hydrate 64 ounces to 72 ounces of fluid a day.    Decrease lisinopril to 5 mg and monitor. Bring your record and BP cuff to the next apt. If the BP is less than 100/60 hold the lisinopril for that day.     Continue the metoprolol.     Debrox drops to right ear only for a week before your next apt.    Follow up in 2 weeks for a BP check or sooner as needed

## 2024-03-21 LAB
AMB EXT CHOLESTEROL, TOTAL: 135 MG/DL
AMB EXT HDL CHOLESTEROL: 48 MG/DL
AMB EXT HEMATOCRIT: 38.2
AMB EXT HEMOGLOBIN: 11.8
AMB EXT LDL CHOLESTEROL, DIRECT: 73 MG/DL
AMB EXT MCV: 106.1
AMB EXT PLATELETS: 173
AMB EXT TRIGLYCERIDES: 94 MG/DL
AMB EXT WBC: 6.1 X10(3)UL

## 2024-03-22 ENCOUNTER — TELEPHONE (OUTPATIENT)
Dept: INTERNAL MEDICINE CLINIC | Facility: CLINIC | Age: 79
End: 2024-03-22

## 2024-03-22 DIAGNOSIS — R71.8 ELEVATED MCV: Primary | ICD-10-CM

## 2024-03-22 NOTE — TELEPHONE ENCOUNTER
Incoming (mail or fax):  fax   Received from:  formerly [facility]  Documentation given to:  results bin

## 2024-03-25 NOTE — TELEPHONE ENCOUNTER
Reviewed.   No anemia.   Elevated mcv.   Can they add on b12 and folic acid levels?   Lipids okay.

## 2024-03-25 NOTE — TELEPHONE ENCOUNTER
I see. Noted. Thanks.   Yes please have her return for labs, it looks like it was done at her living facility.

## 2024-03-25 NOTE — TELEPHONE ENCOUNTER
Orders placed, pt informed and verbalized understanding. Would like them drawn at her facility, she is going to send us the fax # to send orders to via [employer].

## 2024-03-25 NOTE — TELEPHONE ENCOUNTER
May be able to add B12 but folic acid needs to be light protected so this unfortunately can't  ever be added on. Do you want pt to return for labs? Thanks!

## 2024-04-09 ENCOUNTER — TELEPHONE (OUTPATIENT)
Dept: INTERNAL MEDICINE CLINIC | Facility: CLINIC | Age: 79
End: 2024-04-09

## 2024-04-09 NOTE — TELEPHONE ENCOUNTER
She needs to go to the ER due to the weakness, dizziness and her age. She may some electrolytes off that need to be replaced and she may need IV fluids for hydration. If she does not have a ride she should call 911. Thanks.

## 2024-04-09 NOTE — TELEPHONE ENCOUNTER
Rec call from patient. Had \"stomach flu\" Sunday and Monday. Experienced vomiting and diarrhea during that time that has resolved. Now feeling weak and lightheaded. Denies extreme thirst, less frequent urination, dark-color urine, confusion. Hydrating with 7 up , cranberry/peach juice and water. Bp last night 140/80. Hadn't taken meds 2 days prior to taking it because of the vomiting. Current bp while on phone 112/76. Patient has resumed bp meds since  can tolerate food but asking what she can do regarding feeling weak/lightheaded? Advised appt may be required. Patient states it is impossible to come in as she does not have transportation.  Please advise- thanks!

## 2024-04-19 ENCOUNTER — OFFICE VISIT (OUTPATIENT)
Dept: INTERNAL MEDICINE CLINIC | Facility: CLINIC | Age: 79
End: 2024-04-19
Payer: MEDICARE

## 2024-04-19 VITALS
DIASTOLIC BLOOD PRESSURE: 82 MMHG | TEMPERATURE: 98 F | OXYGEN SATURATION: 98 % | WEIGHT: 166.81 LBS | SYSTOLIC BLOOD PRESSURE: 122 MMHG | RESPIRATION RATE: 14 BRPM | HEIGHT: 64.5 IN | BODY MASS INDEX: 28.13 KG/M2 | HEART RATE: 65 BPM

## 2024-04-19 DIAGNOSIS — R71.8 ELEVATED MCV: ICD-10-CM

## 2024-04-19 DIAGNOSIS — H61.21 IMPACTED CERUMEN OF RIGHT EAR: ICD-10-CM

## 2024-04-19 DIAGNOSIS — I10 PRIMARY HYPERTENSION: Primary | ICD-10-CM

## 2024-04-19 PROCEDURE — 99214 OFFICE O/P EST MOD 30 MIN: CPT | Performed by: NURSE PRACTITIONER

## 2024-04-19 RX ORDER — METOPROLOL SUCCINATE 25 MG/1
25 TABLET, EXTENDED RELEASE ORAL DAILY
Qty: 90 TABLET | Refills: 1 | Status: SHIPPED | OUTPATIENT
Start: 2024-04-19

## 2024-04-19 RX ORDER — LISINOPRIL 10 MG/1
TABLET ORAL
COMMUNITY
Start: 2024-03-28 | End: 2024-04-19

## 2024-04-19 RX ORDER — LISINOPRIL 10 MG/1
10 TABLET ORAL DAILY
Qty: 90 TABLET | Refills: 1 | Status: SHIPPED | OUTPATIENT
Start: 2024-04-19

## 2024-04-19 NOTE — PROGRESS NOTES
Nereida Dey is a 78 year old female.  CHIEF COMPLAINT   BP check    HPI:   HTN- - Stable. No headaches or vision changes. No SE to medication. Taking medication as prescribed.     Elevated MCV- hgb is stable. Has a 4 oz glass of wine per week.     Wax to ear- would like it irrigated.     Current Outpatient Medications   Medication Sig Dispense Refill    lisinopril 10 MG Oral Tab       atomoxetine 10 MG Oral Cap Take 2 capsules (20 mg total) by mouth every morning. 60 capsule 0    ARIPiprazole (ABILIFY) 2 MG Oral Tab Take 1 tablet (2 mg total) by mouth at bedtime. 90 tablet 1    PARoxetine 20 MG Oral Tab Take 3 tablets (60 mg total) by mouth at bedtime. 90 tablet 2    QUEtiapine 25 MG Oral Tab Take 1.5 tablets (37.5 mg total) by mouth nightly. 45 tablet 1    QUEtiapine 50 MG Oral Tab Take 1 tablet (50 mg total) by mouth nightly.      Cholecalciferol (VITAMIN D) 50 MCG (2000 UT) Oral Cap Take by mouth.      Magnesium Aspartate 65 MG Oral Tab Take 2 tablets (130 mg total) by mouth daily.      Ascorbic Acid (VITAMIN C) 1000 MG Oral Tab Take 1 tablet (1,000 mg total) by mouth daily.      metoprolol succinate ER 25 MG Oral Tablet 24 Hr Take 1 tablet (25 mg total) by mouth daily. 90 tablet 1    atorvastatin 40 MG Oral Tab Take 1 tablet (40 mg total) by mouth nightly. TAKE AT BEDTIME      Cyanocobalamin 1000 MCG/ML Injection Kit       aspirin 81 MG Oral Chew Tab Chew 1 tablet (81 mg total) by mouth daily.      fluticasone propionate 50 MCG/ACT Nasal Suspension Instill 2 sprays nasally daily. (Patient not taking: Reported on 4/19/2024) 15.8 mL 1    Turmeric (CURCUMIN 95 OR) Take 1,000 mg by mouth. (Patient not taking: Reported on 3/12/2024)      Multiple Vitamins-Minerals (BIOTIN PLUS/CALCIUM/VIT D3) Oral Tab Take by mouth. (Patient not taking: Reported on 3/12/2024)      pyridoxine 100 MG Oral Tab Take 0.5 tablets (50 mg total) by mouth daily. (Patient not taking: Reported on 4/19/2024)        Past Medical History:     A-fib (HCC)    Acute, but ill-defined, cerebrovascular disease    Breast cancer (HCC)    Constipation    Feeling lonely    Hearing loss    Hemorrhoids    High cholesterol    History of depression    Irregular bowel habits    Leaking of urine    Mass, ovarian    Stool incontinence    TIA (transient ischemic attack)    Wears glasses      Social History:  Social History     Socioeconomic History    Marital status:     Number of children: 2   Occupational History    Occupation: Retired RN   Tobacco Use    Smoking status: Never     Passive exposure: Never    Smokeless tobacco: Never   Vaping Use    Vaping status: Never Used   Substance and Sexual Activity    Alcohol use: Yes     Comment: approx 2 drinks per month    Drug use: Never    Sexual activity: Not Currently   Other Topics Concern    Occupational Exposure No    Special Diet No    Exercise Yes    Seat Belt Yes    Self-Exams Yes     Social Determinants of Health      [facility]    Housing Stability        REVIEW OF SYSTEMS:   See HPI     EXAM:     /82 (BP Location: Right arm, Patient Position: Sitting, Cuff Size: large)   Pulse 65   Temp 97.6 °F (36.4 °C) (Temporal)   Resp 14   Ht 5' 4.5\" (1.638 m)   Wt 166 lb 12.8 oz (75.7 kg)   SpO2 98%   BMI 28.19 kg/m²   Body mass index is 28.19 kg/m².   GENERAL: well developed, well nourished,in no apparent distress  HEENT: atraumatic, normocephalic  EYES:  conjunctiva are clear  LUNGS: clear to auscultation; no rhonchi, rales, or wheezing  CARDIO: RRR without murmur  GI: no tenderness  MUSCULOSKELETAL: Normal gait.  EXTREMITIES: no edema  NEURO: Oriented times three,cranial nerves are grossly intact,motor and sensory are grossly intact  Romberg is normal, no pronator drift, rapid movements are intact- tandem heel and toe walking not able to be done.    LABS:      Lab Results   Component Value Date    WBC 6.1 03/21/2024    RBC 4.16 11/15/2022     HGB 11.8 03/21/2024    HCT 38.2 03/21/2024    .1 03/21/2024    MCH 31.5 11/15/2022    MCHC 33.1 11/15/2022    RDW 14.6 11/15/2022     03/21/2024      Lab Results   Component Value Date    GLU 82 12/11/2023    BUN 15 12/11/2023    BUNCREA NOT APPLICABLE 11/15/2022    CREATSERUM 0.86 12/11/2023    GFRNAA 68 12/11/2023    CA 8.6 12/11/2023    ALKPHO 55 11/15/2022    AST 19 12/11/2023    ALT 16 12/11/2023    BILT 0.4 12/11/2023    TP 6.1 (A) 12/11/2023    ALB 4.1 11/15/2022    GLOBULIN 2.9 11/15/2022    AGRATIO 1.4 11/15/2022     11/15/2022    K 4.3 12/11/2023     12/11/2023    CO2 31 12/11/2023      Lab Results   Component Value Date    CHOLEST 135 03/21/2024    TRIG 94 03/21/2024    HDL 48 03/21/2024      Lab Results   Component Value Date    TSH 2.470 12/11/2023        ASSESSMENT AND PLAN:   1. Primary hypertension  - - Stable. No headaches or vision changes. No SE to medication. Taking medication as prescribed.   - continue current medication   - metoprolol succinate ER 25 MG Oral Tablet 24 Hr; Take 1 tablet (25 mg total) by mouth daily.  Dispense: 90 tablet; Refill: 1  - lisinopril 10 MG Oral Tab; Take 1 tablet (10 mg total) by mouth daily.  Dispense: 90 tablet; Refill: 1    2. Elevated MCV  - has minimal alcohol  - will get b12 and folate level don with next labs    3. Impacted cerumen of right ear  - irrigated, TM clear         The patient indicates understanding of these issues and agrees to the plan.  The patient is asked to return as needed or when routine care is due in October

## 2024-04-19 NOTE — PATIENT INSTRUCTIONS
Get the labs done. No need to fast.    Continue your current medication    Follow up in October for your annual visit or sooner as needed

## 2024-04-30 RX ORDER — FLUTICASONE PROPIONATE 50 MCG
2 SPRAY, SUSPENSION (ML) NASAL DAILY
Qty: 48 G | Refills: 0 | Status: SHIPPED | OUTPATIENT
Start: 2024-04-30

## 2024-04-30 NOTE — TELEPHONE ENCOUNTER
Allergy Medication Protocol [id number]/30/2024 04:32 AM   Protocol Details In person appointment or virtual visit in the past 12 mos or appointment in next 3 mos     Future Appointments   Date Time Provider Department Center   4/30/2024  7:00 PM Zenobia Centeno APRN [facility]   7/1/2024 12:00 PM Blanca Barraza APRN [facility]

## 2024-07-30 RX ORDER — FLUTICASONE PROPIONATE 50 MCG
2 SPRAY, SUSPENSION (ML) NASAL DAILY
Qty: 48 G | Refills: 0 | Status: SHIPPED | OUTPATIENT
Start: 2024-07-30

## 2024-07-30 NOTE — TELEPHONE ENCOUNTER
Allergy Medication Protocol [id number]/30/2024 04:36 AM   Protocol Details In person appointment or virtual visit in the past 12 mos or appointment in next 3 mos        Future Appointments   Date Time Provider Department Center   8/19/2024  7:00 PM Zenobia Centeno APRN [facility]

## 2024-10-28 ENCOUNTER — TELEPHONE (OUTPATIENT)
Dept: INTERNAL MEDICINE CLINIC | Facility: CLINIC | Age: 79
End: 2024-10-28

## 2024-10-28 RX ORDER — FLUTICASONE PROPIONATE 50 MCG
2 SPRAY, SUSPENSION (ML) NASAL DAILY
Qty: 48 G | Refills: 0 | Status: SHIPPED | OUTPATIENT
Start: 2024-10-28

## 2024-10-28 NOTE — TELEPHONE ENCOUNTER
Allergy Medication Protocol Uhbvlr22/27/2024 04:26 AM   Protocol Details In person appointment or virtual visit in the past 12 mos or appointment in next 3 mos        Future Appointments   Date Time Provider Department Center   10/28/2024  6:30 PM Zenobia Centeno APRN [facility]         Refill sent, pt is due for annual physical exam. Please call to schedule thank you.

## 2024-10-28 NOTE — TELEPHONE ENCOUNTER
Incoming (mail or fax):  Fax  Received from:  [facility]  Documentation given to:  Triage in    [employer] Form(s)

## 2024-11-08 NOTE — TELEPHONE ENCOUNTER
Unable to leave message to schedule annual wellness visit - mailbox full. Attempted to contact patient multiple times - letter mailed and sent through [employer]

## 2024-12-04 ENCOUNTER — TELEPHONE (OUTPATIENT)
Dept: INTERNAL MEDICINE CLINIC | Facility: CLINIC | Age: 79
End: 2024-12-04

## 2024-12-04 NOTE — TELEPHONE ENCOUNTER
Does not look that we prescribed it. It was external med in the medication. Maybe cards?    Please clarify that is what you see as well?

## 2024-12-04 NOTE — TELEPHONE ENCOUNTER
Patient said Louis keeps sending Lipitor refills to her.  She thought [facility] had stopped prescribing this medication.  [facility] needs notification of this change from [facility].  Please advise. Thank you!

## 2024-12-26 ENCOUNTER — TELEPHONE (OUTPATIENT)
Dept: INTERNAL MEDICINE CLINIC | Facility: CLINIC | Age: 79
End: 2024-12-26

## 2024-12-26 DIAGNOSIS — I10 PRIMARY HYPERTENSION: ICD-10-CM

## 2024-12-26 NOTE — TELEPHONE ENCOUNTER
PSR called patient to schedule an appointment after receiving a medication request.  Patient said she has moved and has a new PCP.  Please start PCP removal process.

## 2024-12-27 ENCOUNTER — PATIENT OUTREACH (OUTPATIENT)
Dept: CASE MANAGEMENT | Age: 79
End: 2024-12-27

## 2024-12-27 RX ORDER — LISINOPRIL 10 MG/1
10 TABLET ORAL DAILY
Qty: 90 TABLET | Refills: 0 | Status: SHIPPED | OUTPATIENT
Start: 2024-12-27

## 2024-12-27 NOTE — PROCEDURES
The office order for PCP removal request is Approved and finalized on December 27, 2024.    Removed Janki Dowell MD as the patient's Primary Care Physician

## 2024-12-27 NOTE — TELEPHONE ENCOUNTER
Last OV relevant to medication: 4/19/24  Last refill date: 4/19/24 #90/refills: 1  When pt was asked to return for OV: October for annua  Upcoming appt/reason:   Future Appointments   Date Time Provider Department Center   4/16/2025  6:00 PM Zenobia Centeno APRN [facility]   Was pt informed of any over due labs: overdue  Lab Results   Component Value Date    GLU 82 12/11/2023    BUN 15 12/11/2023    BUNCREA NOT APPLICABLE 11/15/2022    CREATSERUM 0.86 12/11/2023    GFRNAA 68 12/11/2023    CA 8.6 12/11/2023    ALKPHO 55 11/15/2022    AST 19 12/11/2023    ALT 16 12/11/2023    BILT 0.4 12/11/2023    TP 6.1 (A) 12/11/2023    ALB 4.1 11/15/2022    GLOBULIN 2.9 11/15/2022    AGRATIO 1.4 11/15/2022     11/15/2022    K 4.3 12/11/2023     12/11/2023    CO2 31 12/11/2023     Pt overdue for annual, please call to schedule thanks!!

## 2025-01-27 RX ORDER — FLUTICASONE PROPIONATE 50 MCG
2 SPRAY, SUSPENSION (ML) NASAL DAILY
Qty: 16 G | Refills: 0 | Status: SHIPPED | OUTPATIENT
Start: 2025-01-27 | End: 2025-03-03

## 2025-01-27 NOTE — TELEPHONE ENCOUNTER
Allergy Medication Protocol [id number]/25/2025 04:39 AM   Protocol Details In person appointment or virtual visit in the past 12 mos or appointment in next 3 mos    Medication is active on med list        Future Appointments   Date Time Provider Department Center   3/3/2025  3:20 PM Justo James PA [facility]   4/16/2025  8:00 PM Zenobia Centeno APRN [facility]

## 2025-03-03 RX ORDER — FLUTICASONE PROPIONATE 50 MCG
2 SPRAY, SUSPENSION (ML) NASAL DAILY
Qty: 16 G | Refills: 0 | Status: SHIPPED | OUTPATIENT
Start: 2025-03-03

## 2025-03-03 NOTE — TELEPHONE ENCOUNTER
Allergy Medication Protocol Passed02/28/2025 04:33 AM   Protocol Details In person appointment or virtual visit in the past 12 mos or appointment in next 3 mos    Medication is active on med list

## 2025-03-28 RX ORDER — FLUTICASONE PROPIONATE 50 MCG
2 SPRAY, SUSPENSION (ML) NASAL DAILY
Refills: 0 | OUTPATIENT
Start: 2025-03-28

## 2025-07-08 ENCOUNTER — APPOINTMENT (OUTPATIENT)
Dept: CT IMAGING | Facility: HOSPITAL | Age: 80
End: 2025-07-08
Attending: EMERGENCY MEDICINE
Payer: MEDICARE

## 2025-07-08 ENCOUNTER — HOSPITAL ENCOUNTER (EMERGENCY)
Facility: HOSPITAL | Age: 80
Discharge: NURSING, REHAB OR LONG-TERM CARE | End: 2025-07-08
Attending: EMERGENCY MEDICINE
Payer: MEDICARE

## 2025-07-08 VITALS
TEMPERATURE: 98 F | SYSTOLIC BLOOD PRESSURE: 113 MMHG | HEART RATE: 79 BPM | RESPIRATION RATE: 17 BRPM | OXYGEN SATURATION: 98 % | DIASTOLIC BLOOD PRESSURE: 60 MMHG

## 2025-07-08 DIAGNOSIS — S20.229A CONTUSION OF BACK, UNSPECIFIED LATERALITY, INITIAL ENCOUNTER: ICD-10-CM

## 2025-07-08 DIAGNOSIS — M54.2 NECK PAIN: ICD-10-CM

## 2025-07-08 DIAGNOSIS — N13.30 HYDRONEPHROSIS, UNSPECIFIED HYDRONEPHROSIS TYPE: ICD-10-CM

## 2025-07-08 DIAGNOSIS — S09.90XA INJURY OF HEAD, INITIAL ENCOUNTER: Primary | ICD-10-CM

## 2025-07-08 PROCEDURE — 99285 EMERGENCY DEPT VISIT HI MDM: CPT

## 2025-07-08 PROCEDURE — 72131 CT LUMBAR SPINE W/O DYE: CPT | Performed by: RADIOLOGY

## 2025-07-08 PROCEDURE — S0119 ONDANSETRON 4 MG: HCPCS | Performed by: EMERGENCY MEDICINE

## 2025-07-08 PROCEDURE — 72128 CT CHEST SPINE W/O DYE: CPT | Performed by: RADIOLOGY

## 2025-07-08 PROCEDURE — 70450 CT HEAD/BRAIN W/O DYE: CPT | Performed by: RADIOLOGY

## 2025-07-08 PROCEDURE — 72125 CT NECK SPINE W/O DYE: CPT | Performed by: RADIOLOGY

## 2025-07-08 RX ORDER — HYDROCODONE BITARTRATE AND ACETAMINOPHEN 5; 325 MG/1; MG/1
1 TABLET ORAL ONCE
Refills: 0 | Status: COMPLETED | OUTPATIENT
Start: 2025-07-08 | End: 2025-07-08

## 2025-07-08 RX ORDER — ONDANSETRON 4 MG/1
4 TABLET, ORALLY DISINTEGRATING ORAL ONCE
Status: COMPLETED | OUTPATIENT
Start: 2025-07-08 | End: 2025-07-08

## 2025-07-08 NOTE — DISCHARGE INSTRUCTIONS
Continue medications.  Follow-up with doctors.  Continue pain meds or Tylenol.  Return to the ER for changes or worsening.  Read all instructions.    There was no acute trauma seen on the CAT scans but patient does have a right hydronephrosis that was also seen on May 1 at [place]'s CAT scan

## 2025-07-08 NOTE — ED INITIAL ASSESSMENT (HPI)
Pt here via EMS for a fall backward out of the wheelchair in the medicar. Pt c/o back pain and posterior head pain. Pt A&Ox4. Pt on Eliquis. Took dose this morning.

## 2025-07-08 NOTE — ED PROVIDER NOTES
Patient Seen in: [facility] Emergency Department        History  Chief Complaint   Patient presents with    Fall    Back Pain     Stated Complaint:     Subjective:   79-year-old female with history of A-fib on Eliquis, breast cancer, depression, TIA here status post fall.  She was being taken to her rehab facility from [place] and she said they did not secure her chair in the transport van and it went back words and she hit her head.  She did not lose consciousness.  She does have slight headache and has chronic posterior neck pain.  She is also having new back pain but also has some chronic back pain.  She said it is both upper and lower back pain.  Denies LOC nausea vomiting.  No vision change.  No weakness or numbness.  No pain in her hips or chest or abdomen.  I did speak to EMS directly who told me she fell forward but she told both the nurse and I that she fell backward.  Medications from her prior hospital were reviewed with the paperwork that was sent with her.                      Objective:     Past Medical History:    A-fib (HCC)    Acute, but ill-defined, cerebrovascular disease    Breast cancer (HCC)    Constipation    Feeling lonely    Hearing loss    Hemorrhoids    High cholesterol    History of depression    Irregular bowel habits    Leaking of urine    Mass, ovarian    Stool incontinence    TIA (transient ischemic attack)    Wears glasses              Past Surgical History:   Procedure Laterality Date    Cataract Bilateral 2021    Mastectomy left Left 1983    Oophorectomy Left 1965    d/t large ovarian cyst    Tcat mitral valve repair initial prosthesis  2014    Total abdominal hysterectomy  11/21/2022    [facility], Right salpingo-oophorectomy, L salpingectomy due to large pelvic/ovarian cancer by Dr. White                Social History     Socioeconomic History    Marital status:     Number of children: 2   Occupational History    Occupation: Retired RN   Tobacco Use    Smoking status:  Never     Passive exposure: Never    Smokeless tobacco: Never   Vaping Use    Vaping status: Never Used   Substance and Sexual Activity    Alcohol use: Yes     Comment: approx 2 drinks per month    Drug use: Never    Sexual activity: Not Currently   Other Topics Concern    Occupational Exposure No    Special Diet No    Exercise Yes    Seat Belt Yes    Self-Exams Yes     Social Drivers of Health     Food Insecurity: No Food Insecurity (7/3/2025)    Received from [facility]    Hunger Vital Sign     Worried About Running Out of Food in the Last Year: Never true     Ran Out of Food in the Last Year: Never true   Transportation Needs: No Transportation Needs (7/3/2025)    Received from [facility]    PRAPARE - Transportation     Lack of Transportation (Medical): No     Lack of Transportation (Non-Medical): No   Housing Stability: Low Risk  (7/3/2025)    Received from [facility]    Housing Stability Vital Sign     Unable to Pay for Housing in the Last Year: No     Number of Times Moved in the Last Year: 0     Homeless in the Last Year: No                                Physical Exam    ED Triage Vitals [07/08/25 1545]   /72   Pulse 75   Resp 17   Temp 97.7 °F (36.5 °C)   Temp src Temporal   SpO2 99 %   O2 Device None (Room air)       Current Vitals:   Vital Signs  BP: 127/68  Pulse: 79  Resp: 17  Temp: 97.7 °F (36.5 °C)  Temp src: Temporal  MAP (mmHg): 85    Oxygen Therapy  SpO2: 98 %  O2 Device: None (Room air)            Physical Exam  HENT:      Head: Normocephalic and atraumatic.      Right Ear: External ear normal.      Left Ear: External ear normal.      Mouth/Throat:      Mouth: Mucous membranes are dry.   Eyes:      Extraocular Movements: Extraocular movements intact.      Pupils: Pupils are equal, round, and reactive to light.   Neck:      Comments: Mild low C-spine tenderness and tenderness throughout the entire spine but no  step-offs  Cardiovascular:      Rate and Rhythm: Normal rate and regular rhythm.      Pulses: Normal pulses.      Heart sounds: Normal heart sounds.   Pulmonary:      Effort: Pulmonary effort is normal.      Breath sounds: Normal breath sounds.   Abdominal:      Palpations: Abdomen is soft.      Tenderness: There is no abdominal tenderness.   Musculoskeletal:         General: No swelling or tenderness.      Comments: Good range of motion of all joints and extremities without any discomfort.  Distal pulses intact.  No deformity.   Skin:     General: Skin is warm.      Capillary Refill: Capillary refill takes less than 2 seconds.   Neurological:      Mental Status: She is alert.      Sensory: No sensory deficit.      Motor: No weakness.                 ED Course  Labs Reviewed - No data to display    ED Course as of 07/08/25 1901  ------------------------------------------------------------  Time: 07/08 1850  Comment: All CTs independently turbid by me.  No acute intracranial hemorrhage or spinal fracture.  Patient does have right hydronephrosis.  The contrast seen was not from our institution.  I did go back into [facility] radiology reports and she has been having significant right hydronephrosis since May 1.  I discussed this with both her son and daughter on the phone individually at great length.  They were upset because the ambulance company let patient                       MDM     CT SPINE THORACIC (CPT=72128)  Result Date: 7/8/2025  1.  CONCLUSION: 2.  No acute fracture or subluxation. 3.  Moderate amount of ascites related to the known ovarian cancer. 4.  Bibasilar pleural effusions with compressive atelectasis. 5.  Large hiatal hernia. 6.  Severe right hydronephrosis with retained contrast in the right renal collecting system.  Electronically Verified and Signed by Attending Radiologist: Octavio Degroot MD 7/8/2025 6:13 PM Workstation: [id number]    CT SPINE CERVICAL (CPT=72125)  Result Date:  7/8/2025  CONCLUSION: No acute fracture or subluxation.  Electronically Verified and Signed by Attending Radiologist: Octavio Degroot MD 7/8/2025 5:59 PM Workstation: [id number]    CT BRAIN OR HEAD (CPT=70450)  Result Date: 7/8/2025  CONCLUSION:  1.  No acute intracranial finding. 2.  Moderate changes of chronic small vessel disease in both cerebral hemispheres. 3.  Large vessel intracranial atherosclerosis. Electronically Verified and Signed by Attending Radiologist: Octavio Degroot MD 7/8/2025 5:54 PM Workstation: [id number]    CT SPINE LUMBAR (CPT=72131)  Result Date: 7/8/2025  CONCLUSION: No acute fracture or subluxation. Chronic impaction deformity S3 segment of sacrum. Partially visualized ascites. Severe right hydroureteronephrosis to the level of distal pelvic ureter with retained contrast in the pelvic calyceal system and ureter. Postsurgical changes which are partially visualized in the pelvis. No contrast excretion into the left kidney. Bilateral pleural effusions. Large hiatal hernia. Electronically Verified and Signed by Attending Radiologist: Octavio Degroot MD 7/8/2025 5:52 PM Workstation: [id number]            Medical Decision Making  Patient on Eliquis here with mechanical fall backwards in the wheelchair.  Could have contusion, concussion, intracranial hemorrhage, fractures, sprains, contusions multiple other problems.  I reviewed all her medications in the packet that was sent with her.    Amount and/or Complexity of Data Reviewed  External Data Reviewed: notes.     Details: [facility] notes explained the patient has malignant ascites and hydronephrosis.  Labs: ordered. Decision-making details documented in ED Course.  Radiology: ordered and independent interpretation performed. Decision-making details documented in ED Course.  Discussion of management or test interpretation with external provider(s): See ED course for lengthy discussion.  Discussed with both children.  They were quite upset  that the transport company allowed her to fall    Risk  Risk Details: Malignant ovarian cancer        Disposition and Plan     Clinical Impression:  1. Injury of head, initial encounter    2. Neck pain    3. Contusion of back, unspecified laterality, initial encounter    4. Hydronephrosis, unspecified hydronephrosis type         Disposition:  Discharge  7/8/2025  6:50 pm    Follow-up:  Gale Barajas MD  [address] [facility] [address]  [phone]    Follow up      We recommend that you schedule follow up care with a primary care provider within the next three months to obtain basic health screening including reassessment of your blood pressure.      Medications Prescribed:  Current Discharge Medication List                Supplementary Documentation:

## 2025-07-09 NOTE — ED QUICK NOTES
This RN called and gave report to MONA Romo at [facility] - pts receiving RN.

## 2025-07-09 NOTE — ED QUICK NOTES
Rounding Completed    Plan of Care reviewed. Waiting for [facility].  Elimination needs assessed.  Provided bedpan.    Bed is locked and in lowest position. Call light within reach.

## 2025-08-23 ENCOUNTER — APPOINTMENT (OUTPATIENT)
Dept: CT IMAGING | Facility: HOSPITAL | Age: 80
End: 2025-08-23
Attending: EMERGENCY MEDICINE

## 2025-08-23 ENCOUNTER — APPOINTMENT (OUTPATIENT)
Dept: GENERAL RADIOLOGY | Facility: HOSPITAL | Age: 80
End: 2025-08-23
Attending: EMERGENCY MEDICINE

## 2025-08-23 ENCOUNTER — HOSPITAL ENCOUNTER (EMERGENCY)
Facility: HOSPITAL | Age: 80
Discharge: HOME | End: 2025-08-23
Attending: EMERGENCY MEDICINE

## 2025-08-23 ENCOUNTER — APPOINTMENT (OUTPATIENT)
Dept: MRI IMAGING | Facility: HOSPITAL | Age: 80
End: 2025-08-23
Attending: EMERGENCY MEDICINE

## 2025-08-23 VITALS
WEIGHT: 134 LBS | OXYGEN SATURATION: 97 % | SYSTOLIC BLOOD PRESSURE: 130 MMHG | BODY MASS INDEX: 23 KG/M2 | RESPIRATION RATE: 27 BRPM | HEART RATE: 80 BPM | DIASTOLIC BLOOD PRESSURE: 58 MMHG | TEMPERATURE: 98 F

## 2025-08-23 DIAGNOSIS — N17.9 ACUTE RENAL FAILURE, UNSPECIFIED ACUTE RENAL FAILURE TYPE: ICD-10-CM

## 2025-08-23 DIAGNOSIS — R18.0 MALIGNANT ASCITES (HCC): Primary | ICD-10-CM

## 2025-08-23 DIAGNOSIS — R53.1 WEAKNESS GENERALIZED: ICD-10-CM

## 2025-08-23 LAB
ALBUMIN SERPL-MCNC: 2.6 G/DL (ref 3.2–4.8)
ALBUMIN/GLOB SERPL: 1.3 (ref 1–2)
ALP LIVER SERPL-CCNC: 98 U/L (ref 55–142)
ALT SERPL-CCNC: 23 U/L (ref 10–49)
ANION GAP SERPL CALC-SCNC: 13 MMOL/L (ref 0–18)
APTT PPP: 42.4 SECONDS (ref 23–36)
AST SERPL-CCNC: 28 U/L (ref ?–34)
ATRIAL RATE: 101 BPM
BASOPHILS # BLD AUTO: 0.01 X10(3) UL (ref 0–0.2)
BASOPHILS NFR BLD AUTO: 0.1 %
BILIRUB SERPL-MCNC: 0.5 MG/DL (ref 0.2–1.1)
BUN BLD-MCNC: 97 MG/DL (ref 9–23)
BUN/CREAT SERPL: 36.2 (ref 10–20)
CALCIUM BLD-MCNC: 6.3 MG/DL (ref 8.7–10.4)
CHLORIDE SERPL-SCNC: 98 MMOL/L (ref 98–112)
CO2 SERPL-SCNC: 18 MMOL/L (ref 21–32)
CREAT BLD-MCNC: 2.68 MG/DL (ref 0.55–1.02)
D DIMER PPP FEU-MCNC: 3.98 UG/ML FEU (ref ?–0.81)
DEPRECATED RDW RBC AUTO: 68.1 FL (ref 35.1–46.3)
EGFRCR SERPLBLD CKD-EPI 2021: 17 ML/MIN/1.73M2 (ref 60–?)
EOSINOPHIL # BLD AUTO: 0 X10(3) UL (ref 0–0.7)
EOSINOPHIL NFR BLD AUTO: 0 %
ERYTHROCYTE [DISTWIDTH] IN BLOOD BY AUTOMATED COUNT: 20.9 % (ref 11–15)
GLOBULIN PLAS-MCNC: 2 G/DL (ref 2–3.5)
GLUCOSE BLD-MCNC: 133 MG/DL (ref 70–99)
GLUCOSE BLDC GLUCOMTR-MCNC: 187 MG/DL (ref 70–99)
HCT VFR BLD AUTO: 27.3 % (ref 35–48)
HGB BLD-MCNC: 8.4 G/DL (ref 12–16)
IMM GRANULOCYTES # BLD AUTO: 0.21 X10(3) UL (ref 0–1)
IMM GRANULOCYTES NFR BLD: 2.2 %
INR BLD: 2.4 (ref 0.8–1.2)
LYMPHOCYTES # BLD AUTO: 0.37 X10(3) UL (ref 1–4)
LYMPHOCYTES NFR BLD AUTO: 3.8 %
MCH RBC QN AUTO: 28.3 PG (ref 26–34)
MCHC RBC AUTO-ENTMCNC: 30.8 G/DL (ref 31–37)
MCV RBC AUTO: 91.9 FL (ref 80–100)
MONOCYTES # BLD AUTO: 0.45 X10(3) UL (ref 0.1–1)
MONOCYTES NFR BLD AUTO: 4.7 %
NEUTROPHILS # BLD AUTO: 8.62 X10 (3) UL (ref 1.5–7.7)
NEUTROPHILS # BLD AUTO: 8.62 X10(3) UL (ref 1.5–7.7)
NEUTROPHILS NFR BLD AUTO: 89.2 %
OSMOLALITY SERPL CALC.SUM OF ELEC: 300 MOSM/KG (ref 275–295)
P AXIS: 82 DEGREES
P-R INTERVAL: 174 MS
PLATELET # BLD AUTO: 440 10(3)UL (ref 150–450)
PLATELET MORPHOLOGY: NORMAL
POTASSIUM SERPL-SCNC: 4.3 MMOL/L (ref 3.5–5.1)
PROT SERPL-MCNC: 4.6 G/DL (ref 5.7–8.2)
PROTHROMBIN TIME: 27.2 SECONDS (ref 11.6–14.8)
Q-T INTERVAL: 348 MS
QRS DURATION: 82 MS
QTC CALCULATION (BEZET): 451 MS
R AXIS: -21 DEGREES
RBC # BLD AUTO: 2.97 X10(6)UL (ref 3.8–5.3)
SODIUM SERPL-SCNC: 129 MMOL/L (ref 136–145)
T AXIS: 111 DEGREES
VENTRICULAR RATE: 101 BPM
WBC # BLD AUTO: 9.7 X10(3) UL (ref 4–11)

## 2025-08-23 PROCEDURE — 99285 EMERGENCY DEPT VISIT HI MDM: CPT

## 2025-08-23 PROCEDURE — 70551 MRI BRAIN STEM W/O DYE: CPT | Performed by: EMERGENCY MEDICINE

## 2025-08-23 PROCEDURE — 70496 CT ANGIOGRAPHY HEAD: CPT | Performed by: EMERGENCY MEDICINE

## 2025-08-23 PROCEDURE — 82962 GLUCOSE BLOOD TEST: CPT

## 2025-08-23 PROCEDURE — 70498 CT ANGIOGRAPHY NECK: CPT | Performed by: EMERGENCY MEDICINE

## 2025-08-23 PROCEDURE — 85610 PROTHROMBIN TIME: CPT | Performed by: EMERGENCY MEDICINE

## 2025-08-23 PROCEDURE — 85025 COMPLETE CBC W/AUTO DIFF WBC: CPT | Performed by: EMERGENCY MEDICINE

## 2025-08-23 PROCEDURE — 93010 ELECTROCARDIOGRAM REPORT: CPT

## 2025-08-23 PROCEDURE — 85730 THROMBOPLASTIN TIME PARTIAL: CPT | Performed by: EMERGENCY MEDICINE

## 2025-08-23 PROCEDURE — 71045 X-RAY EXAM CHEST 1 VIEW: CPT | Performed by: EMERGENCY MEDICINE

## 2025-08-23 PROCEDURE — 96374 THER/PROPH/DIAG INJ IV PUSH: CPT

## 2025-08-23 PROCEDURE — 85379 FIBRIN DEGRADATION QUANT: CPT | Performed by: EMERGENCY MEDICINE

## 2025-08-23 PROCEDURE — 93005 ELECTROCARDIOGRAM TRACING: CPT

## 2025-08-23 PROCEDURE — 96376 TX/PRO/DX INJ SAME DRUG ADON: CPT

## 2025-08-23 PROCEDURE — 74176 CT ABD & PELVIS W/O CONTRAST: CPT | Performed by: EMERGENCY MEDICINE

## 2025-08-23 PROCEDURE — 80053 COMPREHEN METABOLIC PANEL: CPT | Performed by: EMERGENCY MEDICINE

## 2025-08-23 PROCEDURE — 70450 CT HEAD/BRAIN W/O DYE: CPT | Performed by: EMERGENCY MEDICINE

## 2025-08-23 RX ORDER — MORPHINE SULFATE 4 MG/ML
4 INJECTION, SOLUTION INTRAMUSCULAR; INTRAVENOUS ONCE
Status: COMPLETED | OUTPATIENT
Start: 2025-08-23 | End: 2025-08-23

## (undated) NOTE — LETTER
[address] [place] [address] [id number]  [facility]: [phone]  FAX: [phone]     Date:  2023     Patient:  Ahsan NOGUERA 10/11/1945        To Whom it may concern: This letter has been written at the patient's request. The above patient was seen at the [facility] for treatment of a medical condition. Due to patient cancer diagnosis, patient needs accelerated level of care.        Sincerely,    Dr Dayne Johnson NP

## (undated) NOTE — LETTER
November 8, 2024        Nereida Dey  [address]  [place] [address]      Dear Nereida:    We have attempted to contact you by phone with no success. In an effort to provide quality patient care we are reaching out to you to contact the office at your earliest convenience to schedule an annual wellness visit with Z[facility].    If you have established care with a different provider, please call our office so we can update your file to list the correct provider name and information. Once we update your file with this information it will eliminate further phone calls and/or correspondence from our office.Thank you for your cooperation.      If you have recently contacted us, please disregard this letter.      You may call the office at [phone] our phones are on service Monday-Thursday 8am-4:45 pm and Friday 8am-3 pm.      Thank you,      The office of Blanca Barraza NP